# Patient Record
Sex: FEMALE | Race: OTHER | HISPANIC OR LATINO | Employment: UNEMPLOYED | URBAN - METROPOLITAN AREA
[De-identification: names, ages, dates, MRNs, and addresses within clinical notes are randomized per-mention and may not be internally consistent; named-entity substitution may affect disease eponyms.]

---

## 2017-03-28 ENCOUNTER — HOSPITAL ENCOUNTER (EMERGENCY)
Facility: HOSPITAL | Age: 37
Discharge: HOME | End: 2017-03-28
Admitting: EMERGENCY MEDICINE
Payer: COMMERCIAL

## 2017-03-28 VITALS
SYSTOLIC BLOOD PRESSURE: 127 MMHG | DIASTOLIC BLOOD PRESSURE: 75 MMHG | OXYGEN SATURATION: 98 % | TEMPERATURE: 97.5 F | HEART RATE: 70 BPM | WEIGHT: 170 LBS | RESPIRATION RATE: 16 BRPM

## 2017-03-28 DIAGNOSIS — H57.89 IRRITATION OF BOTH EYES: Primary | ICD-10-CM

## 2017-03-28 PROCEDURE — 99283 EMERGENCY DEPT VISIT LOW MDM: CPT

## 2017-03-28 RX ORDER — POLYMYXIN B SULFATE AND TRIMETHOPRIM 1; 10000 MG/ML; [USP'U]/ML
1 SOLUTION OPHTHALMIC EVERY 4 HOURS
Qty: 10 ML | Refills: 0 | Status: SHIPPED | OUTPATIENT
Start: 2017-03-28 | End: 2017-04-04

## 2017-03-28 RX ORDER — PROPARACAINE HYDROCHLORIDE 5 MG/ML
2 SOLUTION/ DROPS OPHTHALMIC ONCE
Status: COMPLETED | OUTPATIENT
Start: 2017-03-28 | End: 2017-03-28

## 2017-03-28 RX ORDER — GABAPENTIN 300 MG/1
300 CAPSULE ORAL 3 TIMES DAILY
COMMUNITY

## 2017-03-28 RX ADMIN — PROPARACAINE HYDROCHLORIDE 2 DROP: 5 SOLUTION/ DROPS OPHTHALMIC at 13:00

## 2017-03-28 RX ADMIN — FLUORESCEIN SODIUM 1 STRIP: 1 STRIP OPHTHALMIC at 13:00

## 2017-12-20 ENCOUNTER — APPOINTMENT (EMERGENCY)
Dept: RADIOLOGY | Facility: HOSPITAL | Age: 37
End: 2017-12-20
Payer: COMMERCIAL

## 2017-12-20 ENCOUNTER — HOSPITAL ENCOUNTER (EMERGENCY)
Facility: HOSPITAL | Age: 37
Discharge: HOME | End: 2017-12-20
Admitting: EMERGENCY MEDICINE
Payer: COMMERCIAL

## 2017-12-20 VITALS
SYSTOLIC BLOOD PRESSURE: 144 MMHG | OXYGEN SATURATION: 98 % | DIASTOLIC BLOOD PRESSURE: 77 MMHG | HEART RATE: 79 BPM | TEMPERATURE: 98.3 F | RESPIRATION RATE: 18 BRPM

## 2017-12-20 DIAGNOSIS — H10.9 CONJUNCTIVITIS: ICD-10-CM

## 2017-12-20 DIAGNOSIS — J45.901 ASTHMA EXACERBATION: ICD-10-CM

## 2017-12-20 DIAGNOSIS — J18.9 PNEUMONIA: Primary | ICD-10-CM

## 2017-12-20 PROCEDURE — 71020 HB CHEST X-RAY 2VW FRONTAL&LATL: CPT

## 2017-12-20 PROCEDURE — 87801 DETECT AGNT MULT DNA AMPLI: CPT | Performed by: PHYSICIAN ASSISTANT

## 2017-12-20 PROCEDURE — 99283 EMERGENCY DEPT VISIT LOW MDM: CPT

## 2017-12-20 RX ORDER — IPRATROPIUM BROMIDE AND ALBUTEROL SULFATE 2.5; .5 MG/3ML; MG/3ML
3 SOLUTION RESPIRATORY (INHALATION) ONCE
Status: COMPLETED | OUTPATIENT
Start: 2017-12-20 | End: 2017-12-20

## 2017-12-20 RX ORDER — IPRATROPIUM BROMIDE AND ALBUTEROL SULFATE 2.5; .5 MG/3ML; MG/3ML
3 SOLUTION RESPIRATORY (INHALATION)
Status: DISCONTINUED | OUTPATIENT
Start: 2017-12-20 | End: 2017-12-20

## 2017-12-20 RX ORDER — PREDNISONE 50 MG/1
50 TABLET ORAL DAILY
Qty: 4 TABLET | Refills: 0 | Status: SHIPPED | OUTPATIENT
Start: 2017-12-21 | End: 2017-12-25

## 2017-12-20 RX ORDER — BENZONATATE 200 MG/1
200 CAPSULE ORAL 3 TIMES DAILY PRN
Qty: 10 CAPSULE | Refills: 0 | Status: SHIPPED | OUTPATIENT
Start: 2017-12-20 | End: 2017-12-23

## 2017-12-20 RX ORDER — ERYTHROMYCIN 5 MG/G
0.5 OINTMENT OPHTHALMIC EVERY 6 HOURS SCHEDULED
Qty: 3.5 G | Refills: 0 | Status: SHIPPED | OUTPATIENT
Start: 2017-12-20 | End: 2017-12-25

## 2017-12-20 RX ORDER — AZITHROMYCIN 250 MG/1
TABLET, FILM COATED ORAL
Qty: 6 TABLET | Refills: 0 | Status: SHIPPED | OUTPATIENT
Start: 2017-12-20 | End: 2019-10-17 | Stop reason: ALTCHOICE

## 2017-12-20 RX ADMIN — IPRATROPIUM BROMIDE AND ALBUTEROL SULFATE 3 ML: .5; 3 SOLUTION RESPIRATORY (INHALATION) at 18:41

## 2017-12-20 RX ADMIN — PREDNISONE 50 MG: 20 TABLET ORAL at 18:35

## 2017-12-20 NOTE — ED PROVIDER NOTES
History  Chief Complaint   Patient presents with    Cough     pt states coughing has been going on since November 27  pt been treating with OTC with no relief  denies fevers, occasional chills, no n/v/d  hx asthma has been using  albuterol and doubling dose  Patient is a 55-year-old female with a past medical history of asthma who presents for evaluation dry cough, nasal congestion, sneezing and bilateral eye discharge/crusting in the morning that started on November 27th  She states she has worn contacts in the past but she has not been wearing them recently  She denies any injury or trauma  She denies any eye redness, pain, swelling, foreign body sensation, tearing, vision changes  She states she has not been evaluated  She has been trying over-the-counter medications and her nebulizer machine at home with some improvement of her symptoms  She states last few days her symptoms have been worse and she has been experiencing more chest tightness and wheezing  She states the last time she used her nebulizer treatment was at 1:30 p m  Lynita Ped She states she is a smoker but she has not been smoking recently due to her symptoms  She denies fever, chills, chest pain, shortness breath, sore throat, ear pain, recent travel, known sick contacts, history of hospitalization or intubation for her asthma  Cough   Associated symptoms: eye discharge and wheezing    Associated symptoms: no chest pain, no chills, no ear pain, no fever, no rash, no shortness of breath and no sore throat        Prior to Admission Medications   Prescriptions Last Dose Informant Patient Reported? Taking? albuterol (PROVENTIL HFA,VENTOLIN HFA) 90 mcg/act inhaler   Yes No   Sig: Inhale 2 puffs every 6 (six) hours as needed for wheezing     gabapentin (NEURONTIN) 300 mg capsule   Yes No   Sig: Take 300 mg by mouth 3 (three) times a day   traMADol (ULTRAM) 50 mg tablet   Yes No   Sig: Take 50 mg by mouth every 6 (six) hours as needed for moderate pain  Facility-Administered Medications: None       Past Medical History:   Diagnosis Date    Asthma     Migraine        Past Surgical History:   Procedure Laterality Date    HYSTERECTOMY         History reviewed  No pertinent family history  I have reviewed and agree with the history as documented  Social History   Substance Use Topics    Smoking status: Current Every Day Smoker     Packs/day: 0 50     Types: Cigarettes    Smokeless tobacco: Never Used      Comment: has not smoked since november    Alcohol use No        Review of Systems   Constitutional: Negative for chills and fever  HENT: Positive for congestion, sinus pain and sinus pressure  Negative for ear pain and sore throat  Eyes: Positive for discharge  Negative for photophobia, pain, redness, itching and visual disturbance  Respiratory: Positive for cough, chest tightness and wheezing  Negative for shortness of breath  Cardiovascular: Negative for chest pain and leg swelling  Gastrointestinal: Negative for abdominal pain, nausea and vomiting  Genitourinary: Negative for dysuria  Musculoskeletal: Negative for neck pain and neck stiffness  Skin: Negative for rash  All other systems reviewed and are negative  Physical Exam  ED Triage Vitals [12/20/17 1637]   Temperature Pulse Respirations Blood Pressure SpO2   98 3 °F (36 8 °C) 83 18 120/74 98 %      Temp Source Heart Rate Source Patient Position - Orthostatic VS BP Location FiO2 (%)   Oral Monitor Sitting Right arm --      Pain Score       No Pain           Orthostatic Vital Signs  Vitals:    12/20/17 1637 12/20/17 1925   BP: 120/74 144/77   Pulse: 83 79   Patient Position - Orthostatic VS: Sitting Sitting       Physical Exam   Constitutional: Vital signs are normal  She appears well-developed and well-nourished  She is active  Non-toxic appearance  No distress  HENT:   Head: Normocephalic and atraumatic     Right Ear: Hearing, tympanic membrane, external ear and ear canal normal    Left Ear: Hearing, tympanic membrane, external ear and ear canal normal    Nose: Mucosal edema present  Mouth/Throat: Oropharynx is clear and moist  No oropharyngeal exudate  Mild nasal congestion present  Eyes: Conjunctivae, EOM and lids are normal  Pupils are equal, round, and reactive to light  Right eye exhibits no discharge and no exudate  No foreign body present in the right eye  Left eye exhibits no discharge and no exudate  No foreign body present in the left eye  Neck: Normal range of motion  Neck supple  Cardiovascular: Normal rate, regular rhythm and normal heart sounds  Exam reveals no gallop and no friction rub  No murmur heard  Pulmonary/Chest: Effort normal  No respiratory distress  She has wheezes  She has no rales  Mild intermittent exp  Wheezes  Constant dry cough  No accessory muscle usage, stridor, tachypnea  Abdominal: Soft  Bowel sounds are normal  She exhibits no distension  There is no tenderness  There is no guarding  Musculoskeletal: Normal range of motion  Neurological: She is alert  Skin: Skin is warm and dry  She is not diaphoretic  Psychiatric: She has a normal mood and affect  Her behavior is normal  Judgment and thought content normal    Nursing note and vitals reviewed  ED Medications  Medications   predniSONE tablet 50 mg (50 mg Oral Given 12/20/17 1835)   ipratropium-albuterol (DUO-NEB) 0 5-2 5 mg/mL inhalation solution 3 mL (3 mL Nebulization Given 12/20/17 1841)       Diagnostic Studies  Results Reviewed     Procedure Component Value Units Date/Time    Bordetella pertussis / parapertussis PCR [[id number]] Collected:  12/20/17 1829    Lab Status:   In process Specimen:  Nasopharyngeal from Nose Updated:  12/20/17 1856                 XR chest 2 views    (Results Pending)              Procedures  Procedures       Phone Contacts  ED Phone Contact    ED Course  ED Course                                MDM  Number of Diagnoses or Management Options  Asthma exacerbation:   Conjunctivitis:   Pneumonia:   Diagnosis management comments: Patient is a 64-year-old female presents for evaluation of dry cough for almost 1 month  There has been associated nasal congestion, sneezing and eye discharge/crusting in the morning  She has a history of asthma knows there has been occasional wheezing and tightness  She states last few days her symptoms have been worse  She last used her neck nebulizer machine at 1:30 p m  today  She is well-appearing, nontoxic in no acute distress  She has some nasal congestion and discharge and some intermittent expiratory wheezes  She has a constant dry cough  There is no respiratory distress, accessory muscle usage or tachypnea  Will give a DuoNeb treatment here and a dose of prednisone  Patient notes improvement of her symptoms after DuoNeb treatment  There is increased aeration and no current wheezes  Her cough has improved  Will send a pertussis PCR-explained results will not be back today but she will be contacted with any positive results  Will check a chest x-ray  Chest x-ray reviewed by me and interpreted as right lower lobe pneumonia  Discussed results with patient  Will treat asthma exacerbation and pneumonia with azithromycin, prednisone and Tessalon Perles  Discussed supportive treatment with Tylenol/Motrin as directed, tea with honey, throat lozenges, etc   Will give erythromycin ophthalmic ointment for her conjunctivitis  Continue to not use her contact lenses  Instructed to follow up family doctor and eye doctor in 1 day  Return to the ED if symptoms worsen or new symptoms arise  Patient states understanding and agrees with plan         Amount and/or Complexity of Data Reviewed  Clinical lab tests: ordered  Tests in the radiology section of CPT®: ordered and reviewed  Independent visualization of images, tracings, or specimens: yes    Risk of Complications, Morbidity, and/or Mortality  Presenting problems: low  Diagnostic procedures: low  Management options: low    Patient Progress  Patient progress: stable    CritCare Time    Disposition  Final diagnoses:   Pneumonia   Asthma exacerbation   Conjunctivitis     Time reflects when diagnosis was documented in both MDM as applicable and the Disposition within this note     Time User Action Codes Description Comment    12/20/2017  8:00 PM Berdine Bitter Add [J18 9] Pneumonia     12/20/2017  8:00 PM Berdine Bitter Add [M61 743] Asthma exacerbation     12/20/2017  8:04 PM Berdine Bitter Add [H10 9] Conjunctivitis       ED Disposition     ED Disposition Condition Comment    Discharge  Nathaly Crook discharge to home/self care  Condition at discharge: Good        Follow-up Information     Follow up With Specialties Details Why Contact Info Additional Jori Millan MD  Schedule an appointment as soon as possible for a visit in 1 day  [address] [facility]    [address] [facility] Emergency Department Emergency Medicine  If symptoms worsen or new symptoms arise as discussed  [address] [facility]  [phone] [place] [facility], [address], [place], [address]        Discharge Medication List as of 12/20/2017  8:05 PM      START taking these medications    Details   azithromycin (ZITHROMAX) 250 mg tablet Take 2(two) tablets on day 1(one) and take 1 tablet on day 2-5, Print      benzonatate (TESSALON) 200 MG capsule Take 1 capsule by mouth 3 (three) times a day as needed for cough for up to 3 days, Starting Wed 12/20/2017, Until Sat 12/23/2017, Print      erythromycin [facility]) ophthalmic ointment Administer 0 5 inches to both eyes every 6 (six) hours for 5 days, Starting Wed 12/20/2017, Until Mon 12/25/2017, Print      predniSONE 50 mg tablet Take 1 tablet by mouth daily for 4 days, Starting Thu 12/21/2017, Until Mon 12/25/2017, Print CONTINUE these medications which have NOT CHANGED    Details   albuterol (PROVENTIL HFA,VENTOLIN HFA) 90 mcg/act inhaler Inhale 2 puffs every 6 (six) hours as needed for wheezing , Until Discontinued, Historical Med      gabapentin (NEURONTIN) 300 mg capsule Take 300 mg by mouth 3 (three) times a day, Until Discontinued, Historical Med      traMADol (ULTRAM) 50 mg tablet Take 50 mg by mouth every 6 (six) hours as needed for moderate pain , Until Discontinued, Historical Med           No discharge procedures on file      ED Provider  Electronically Signed by           Rossy Felipe PA-C  12/20/17 7355

## 2017-12-21 LAB
B PARAPERT DNA SPEC QL NAA+PROBE: NOT DETECTED
B PERT DNA SPEC QL NAA+PROBE: NOT DETECTED

## 2017-12-21 NOTE — DISCHARGE INSTRUCTIONS
Asthma   WHAT YOU NEED TO KNOW:   Asthma is a lung disease that makes breathing difficult  Chronic inflammation and reactions to triggers narrow the airways in the lungs  Asthma can become life-threatening if it is not managed  DISCHARGE INSTRUCTIONS:   Return to the emergency department if:   · You have severe shortness of breath  · Your lips or nails turn blue or gray  · The skin around your neck and ribs pulls in with each breath  · You have shortness of breath, even after you take your short-term medicine as directed  · Your peak flow numbers are in the red zone of your AAP  Contact your healthcare provider if:   · You run out of medicine before your next refill is due  · Your symptoms get worse  · You need to take more medicine than usual to control your symptoms  · You have questions or concerns about your condition or care  Medicines:   · Medicines  decrease inflammation, open airways, and make it easier to breathe  Medicines may be inhaled, taken as a pill, or injected  Short-term medicines relieve your symptoms quickly  Long-term medicines are used to prevent future attacks  You may also need medicine to help control your allergies  Ask your healthcare provider for more information about the medicine you are given and how to take it safely  · Take your medicine as directed  Contact your healthcare provider if you think your medicine is not helping or if you have side effects  Tell him of her if you are allergic to any medicine  Keep a list of the medicines, vitamins, and herbs you take  Include the amounts, and when and why you take them  Bring the list or the pill bottles to follow-up visits  Carry your medicine list with you in case of an emergency  Follow up with your healthcare provider as directed: You will need to return to make sure your medicine is working and your symptoms are controlled   You may be referred to an asthma specialist  Fabrizio Lewis may be asked to keep a record of your peak flow values and bring it with you to your appointments  Write down your questions so you remember to ask them during your visits  Manage your symptoms and prevent future attacks:   · Follow your Asthma Action Plan (AAP)  This is a written plan that you and your healthcare provider create  It explains which medicine you need and when to change doses if necessary  It also explains how you can monitor symptoms and use a peak flow meter  The meter measures how well your lungs are working  · Manage other health conditions , such as allergies, acid reflux, and sleep apnea  · Identify and avoid triggers  These may include pets, dust mites, mold, and cockroaches  · Do not smoke or be around others who smoke  Nicotine and other chemicals in cigarettes and cigars can cause lung damage  Ask your healthcare provider for information if you currently smoke and need help to quit  E-cigarettes or smokeless tobacco still contain nicotine  Talk to your healthcare provider before you use these products  · Ask about the flu vaccine  The flu can make your asthma worse  You may need a yearly flu shot  © 2017 [address] [facility] Information is for End User's use only and may not be sold, redistributed or otherwise used for commercial purposes  All illustrations and images included in CareNotes® are the copyrighted property of [employer] A M [employer]  or [employer]  The above information is an  only  It is not intended as medical advice for individual conditions or treatments  Talk to your doctor, nurse or pharmacist before following any medical regimen to see if it is safe and effective for you  Pneumonia   WHAT YOU NEED TO KNOW:   Pneumonia is an infection in your lungs caused by bacteria, viruses, fungi, or parasites  You can become infected if you come in contact with someone who is sick   You can get pneumonia if you recently had surgery or needed a ventilator to help you breathe  Pneumonia can also be caused by accidentally inhaling saliva or small pieces of food  Pneumonia may cause mild symptoms, or it can be severe and life-threatening  DISCHARGE INSTRUCTIONS:   Return to the emergency department if:   · You cough up blood  · Your heart beats more than 100 beats in 1 minute  · You are very tired, confused, and cannot think clearly  · You have chest pain or trouble breathing  · Your lips or fingernails turn gray or blue  Contact your healthcare provider if:   · Your symptoms are the same or get worse 48 hours after you start antibiotics  · Your fever is not below 99°F (37 2°C) 48 hours after you start antibiotics  · You have a fever higher than 101°F (38 3°C)  · You cannot eat, or you have loss of appetite, nausea, or are vomiting  · You have questions or concerns about your condition or care  Medicines:   · Antibiotics  treat pneumonia caused by bacteria  · Acetaminophen  decreases pain and fever  It is available without a doctor's order  Ask how much to take and how often to take it  Follow directions  Read the labels of all other medicines you are using to see if they also contain acetaminophen, or ask your doctor or pharmacist  Acetaminophen can cause liver damage if not taken correctly  Do not use more than 4 grams (4,000 milligrams) total of acetaminophen in one day  · NSAIDs , such as ibuprofen, help decrease swelling, pain, and fever  This medicine is available with or without a doctor's order  NSAIDs can cause stomach bleeding or kidney problems in certain people  If you take blood thinner medicine, always ask your healthcare provider if NSAIDs are safe for you  Always read the medicine label and follow directions  · Take your medicine as directed  Contact your healthcare provider if you think your medicine is not helping or if you have side effects   Tell him or her if you are allergic to any medicine  Keep a list of the medicines, vitamins, and herbs you take  Include the amounts, and when and why you take them  Bring the list or the pill bottles to follow-up visits  Carry your medicine list with you in case of an emergency  Follow up with your healthcare provider as directed: You will need to return for more tests  Write down your questions so you remember to ask them during your visits  Manage your symptoms:   · Rest as needed  Rest often throughout the day  Alternate times of activity with times of rest     · Drink liquids as directed  Ask how much liquid to drink each day and which liquids are best for you  Liquids help thin your mucus, which may make it easier for you to cough it up  · Do not smoke  Avoid secondhand smoke  Smoking increases your risk for pneumonia  Smoking also makes it harder for you to get better after you have had pneumonia  Ask your healthcare provider for information if you need help to quit smoking  · Use a cool mist humidifier  A humidifier will help increase air moisture in your home  This may make it easier for you to breathe and help decrease your cough  · Keep your head elevated  You may be able to breathe better if you lie down with the head of your bed up  Prevent pneumonia:   · Prevent the spread of germs  Wash your hands often with soap and water  Use gel hand cleanser when there is no soap and water available  Do not touch your eyes, nose, or mouth unless you have washed your hands first  Cover your mouth when you cough  Cough into a tissue or your shirtsleeve so you do not spread germs from your hands  If you are sick, stay away from others as much as possible  · Limit alcohol  Women should limit alcohol to 1 drink a day  Men should limit alcohol to 2 drinks a day  A drink of alcohol is 12 ounces of beer, 5 ounces of wine, or 1½ ounces of liquor  · Ask about vaccines  You may need a vaccine to help prevent pneumonia   Get an influenza (flu) vaccine every year as soon as it becomes available  © 2017 [address] [employer] Information is for End User's use only and may not be sold, redistributed or otherwise used for commercial purposes  All illustrations and images included in CareNotes® are the copyrighted property of A D A M , Inc  or [employer]  The above information is an  only  It is not intended as medical advice for individual conditions or treatments  Talk to your doctor, nurse or pharmacist before following any medical regimen to see if it is safe and effective for you  Conjunctivitis   WHAT YOU NEED TO KNOW:   Conjunctivitis, or pink eye, is inflammation of your conjunctiva  The conjunctiva is a thin tissue that covers the front of your eye and the back of your eyelids  The conjunctiva helps protect your eye and keep it moist  Conjunctivitis may be caused by bacteria, allergies, or a virus  If your conjunctivitis is caused by bacteria, it may get better on its own in about 7 days  Viral conjunctivitis can last up to 3 weeks  DISCHARGE INSTRUCTIONS:   Return to the emergency department if:   · You have worsening eye pain  · The swelling in your eye gets worse, even after treatment  · Your vision suddenly becomes worse or you cannot see at all  Contact your healthcare provider if:   · You develop a fever and ear pain  · You have tiny bumps or spots of blood on your eye  · You have questions or concerns about your condition or care  Manage your symptoms:   · Apply a cool compress  Wet a washcloth with cold water and place it on your eye  This will help decrease itching and irritation  · Do not wear contact lenses  They can irritate your eye  Throw away the pair you are using and ask when you can wear them again  Use a new pair of lenses when your healthcare provider says it is okay  · Avoid irritants  Stay away from smoke filled areas   Shield your eyes from wind and sun  · Flush your eye  You may need to flush your eye with saline to help decrease your symptoms  Ask for more information on how to flush your eye  Medicines:  Treatment depends on what is causing your conjunctivitis  You may be given any of the following:  · Allergy medicine  helps decrease itchy, red, swollen eyes caused by allergies  It may be given as a pill, eye drops, or nasal spray  · Antibiotics  may be needed if your conjunctivitis is caused by bacteria  This medicine may be given as a pill, eye drops, or eye ointment  · Take your medicine as directed  Contact your healthcare provider if you think your medicine is not helping or if you have side effects  Tell him or her if you are allergic to any medicine  Keep a list of the medicines, vitamins, and herbs you take  Include the amounts, and when and why you take them  Bring the list or the pill bottles to follow-up visits  Carry your medicine list with you in case of an emergency  Prevent the spread of conjunctivitis:   · Wash your hands with soap and water often  Wash your hands before and after you touch your eyes  Also wash your hands before you prepare or eat food and after you use the bathroom or change a diaper  · Avoid allergens  Try to avoid the things that cause your allergies, such as pets, dust, or grass  · Avoid contact with others  Do not share towels or washcloths  Try to stay away from others as much as possible  Ask when you can return to work or school  · Throw away eye makeup  The bacteria that caused your conjunctivitis can stay in eye makeup  Throw away mascara and other eye makeup  © 2017 [address] [employer]  Information is for End User's use only and may not be sold, redistributed or otherwise used for commercial purposes  All illustrations and images included in CareNotes® are the copyrighted property of [employer]  or [employer]    The above information is an  only  It is not intended as medical advice for individual conditions or treatments  Talk to your doctor, nurse or pharmacist before following any medical regimen to see if it is safe and effective for you

## 2018-12-14 ENCOUNTER — HOSPITAL ENCOUNTER (EMERGENCY)
Facility: HOSPITAL | Age: 38
Discharge: HOME | End: 2018-12-14

## 2018-12-14 ENCOUNTER — TELEPHONE (OUTPATIENT)
Dept: FAMILY MEDICINE CLINIC | Facility: CLINIC | Age: 38
End: 2018-12-14

## 2018-12-14 VITALS
TEMPERATURE: 98.5 F | SYSTOLIC BLOOD PRESSURE: 130 MMHG | RESPIRATION RATE: 20 BRPM | OXYGEN SATURATION: 97 % | HEART RATE: 93 BPM | WEIGHT: 166 LBS | DIASTOLIC BLOOD PRESSURE: 91 MMHG

## 2018-12-14 DIAGNOSIS — G43.909 MIGRAINE: Primary | ICD-10-CM

## 2018-12-14 PROCEDURE — 96374 THER/PROPH/DIAG INJ IV PUSH: CPT

## 2018-12-14 PROCEDURE — 96361 HYDRATE IV INFUSION ADD-ON: CPT

## 2018-12-14 PROCEDURE — 99283 EMERGENCY DEPT VISIT LOW MDM: CPT

## 2018-12-14 PROCEDURE — 96375 TX/PRO/DX INJ NEW DRUG ADDON: CPT

## 2018-12-14 RX ORDER — KETOROLAC TROMETHAMINE 30 MG/ML
15 INJECTION, SOLUTION INTRAMUSCULAR; INTRAVENOUS ONCE
Status: COMPLETED | OUTPATIENT
Start: 2018-12-14 | End: 2018-12-14

## 2018-12-14 RX ORDER — METOCLOPRAMIDE HYDROCHLORIDE 5 MG/ML
10 INJECTION INTRAMUSCULAR; INTRAVENOUS ONCE
Status: COMPLETED | OUTPATIENT
Start: 2018-12-14 | End: 2018-12-14

## 2018-12-14 RX ORDER — DIPHENHYDRAMINE HYDROCHLORIDE 50 MG/ML
25 INJECTION INTRAMUSCULAR; INTRAVENOUS ONCE
Status: COMPLETED | OUTPATIENT
Start: 2018-12-14 | End: 2018-12-14

## 2018-12-14 RX ADMIN — DIPHENHYDRAMINE HYDROCHLORIDE 25 MG: 50 INJECTION INTRAMUSCULAR; INTRAVENOUS at 07:01

## 2018-12-14 RX ADMIN — SODIUM CHLORIDE 1000 ML: 0.9 INJECTION, SOLUTION INTRAVENOUS at 07:00

## 2018-12-14 RX ADMIN — KETOROLAC TROMETHAMINE 15 MG: 30 INJECTION, SOLUTION INTRAMUSCULAR at 07:01

## 2018-12-14 RX ADMIN — METOCLOPRAMIDE 10 MG: 5 INJECTION, SOLUTION INTRAMUSCULAR; INTRAVENOUS at 07:00

## 2018-12-14 NOTE — ED PROVIDER NOTES
History  Chief Complaint   Patient presents with    Neck Pain     Pt reports chronic neck pain but worsening in the past two days  Pt denies injury to neck but reports cervical fx years ago  Patient is a 53-year-old female who presents today with a chief complaint of migraine  Patient reports that this migraine feels exactly like previous migraines that she is not experiencing any numbness tingling, weakness, paralysis  Patient reports she does have some associated nausea however has not vomited today  Patient reports light and sound make her headache worse along with movement  Patient reports no recent head trauma  Patient reports she has tried her sumatriptan and Topamax at home which she was prescribed for migraines however they have had no effect that she has had no relief from her headache  Patient reports the migraine started yesterday and the patient has had difficulty sleeping due to the migraine  Patient states she does have associated neck pain however she reports that this neck pain is normal to occur with her migraine  Patient reports she believes the migraine began due to being tense yesterday however she does report lack of sleep as well  Patient reports she has not tried any other medications to relieve her headache and states she normally comes here and gets medication and feels better   used: No        Prior to Admission Medications   Prescriptions Last Dose Informant Patient Reported? Taking? albuterol (PROVENTIL HFA,VENTOLIN HFA) 90 mcg/act inhaler   Yes No   Sig: Inhale 2 puffs every 6 (six) hours as needed for wheezing  azithromycin (ZITHROMAX) 250 mg tablet   No No   Sig: Take 2(two) tablets on day 1(one) and take 1 tablet on day 2-5   gabapentin (NEURONTIN) 300 mg capsule   Yes No   Sig: Take 300 mg by mouth 3 (three) times a day   traMADol (ULTRAM) 50 mg tablet   Yes No   Sig: Take 50 mg by mouth every 6 (six) hours as needed for moderate pain  Facility-Administered Medications: None       Past Medical History:   Diagnosis Date    Asthma     Cervical radiculopathy 10/14/2015    A LITTLE BETTER    Migraine        Past Surgical History:   Procedure Laterality Date    HYSTERECTOMY      PARTIAL HYSTERECTOMY  2003       Family History   Problem Relation Age of Onset    Heart disease Family     Hypertension Family     Hyperlipidemia Family      I have reviewed and agree with the history as documented  Social History   Substance Use Topics    Smoking status: Current Every Day Smoker     Packs/day: 0 50     Types: Cigarettes    Smokeless tobacco: Never Used      Comment: has not smoked since november    Alcohol use Yes      Comment: social        Review of Systems   Constitutional: Negative for chills, fatigue and fever  HENT: Negative for congestion, ear pain, rhinorrhea and sore throat  Eyes: Negative for redness  Respiratory: Negative for chest tightness and shortness of breath  Cardiovascular: Negative for chest pain and palpitations  Gastrointestinal: Positive for nausea  Negative for abdominal pain and vomiting  Genitourinary: Negative for dysuria and hematuria  Musculoskeletal: Negative  Skin: Negative for rash  Neurological: Positive for headaches  Negative for dizziness, syncope, light-headedness and numbness  Physical Exam  Physical Exam   Constitutional: She is oriented to person, place, and time  She appears well-developed and well-nourished  78-year-old female curled laying on her left side rocking appears in pain,  crying   HENT:   Head: Normocephalic  Eyes: Pupils are equal, round, and reactive to light  No scleral icterus  Cardiovascular: Normal rate and regular rhythm  Pulmonary/Chest: Effort normal and breath sounds normal  No stridor  Abdominal: Soft  She exhibits no distension  There is no tenderness  Musculoskeletal: Normal range of motion     Neurological: She is alert and oriented to person, place, and time  She is not disoriented  No cranial nerve deficit  GCS eye subscore is 4  GCS verbal subscore is 5  GCS motor subscore is 6  Skin: Skin is warm and dry  Capillary refill takes less than 2 seconds  Psychiatric: She has a normal mood and affect  Nursing note and vitals reviewed  Vital Signs  ED Triage Vitals [12/14/18 0614]   Temperature Pulse Respirations Blood Pressure SpO2   98 5 °F (36 9 °C) 93 20 130/91 97 %      Temp Source Heart Rate Source Patient Position - Orthostatic VS BP Location FiO2 (%)   Oral Monitor Sitting Right arm --      Pain Score       Worst Possible Pain           Vitals:    12/14/18 0614   BP: 130/91   Pulse: 93   Patient Position - Orthostatic VS: Sitting       Visual Acuity      ED Medications  Medications   sodium chloride 0 9 % bolus 1,000 mL (1,000 mL Intravenous New Bag 12/14/18 0700)   diphenhydrAMINE (BENADRYL) injection 25 mg (25 mg Intravenous Given 12/14/18 0701)   ketorolac (TORADOL) injection 15 mg (15 mg Intravenous Given 12/14/18 0701)   metoclopramide (REGLAN) injection 10 mg (10 mg Intravenous Given 12/14/18 0700)       Diagnostic Studies  Results Reviewed     None                 No orders to display              Procedures  Procedures       Phone Contacts  ED Phone Contact    ED Course             Patient feels much better after IV fluids, and migraine medications given in the ED, patient agreeable to discharge will follow up with PCP                  [facility]  CritCare Time    Disposition  Final diagnoses:   Migraine     Time reflects when diagnosis was documented in both MDM as applicable and the Disposition within this note     Time User Action Codes Description Comment    12/14/2018  8:02 AM Nikki Zacarias [[id number]] Migraine       ED Disposition     ED Disposition Condition Comment    Discharge  Lesliefabi Joseph discharge to home/self care      Condition at discharge: Good        Follow-up Information     Follow up With Specialties Details Why Cheryl Linares MD Family Medicine Schedule an appointment as soon as possible for a visit in 1 day  [address]  [phone]            Patient's Medications   Discharge Prescriptions    No medications on file     No discharge procedures on file      ED Provider  Electronically Signed by           Salome Weldon PA-C  12/14/18 2229

## 2018-12-14 NOTE — DISCHARGE INSTRUCTIONS
Migraine Headache   WHAT YOU SHOULD KNOW:   A migraine is a severe headache  The pain can be so severe that it interferes with your daily activities  A migraine can last a few hours up to several days  The exact cause of migraines is not known  It may be caused by changes in your body chemicals and extra sensitive nerves in your brain  AFTER YOU LEAVE:   Medicines:  Take medicine as soon as you feel a migraine begin  · Pain medicine: You may need medicine to take away or decrease pain  You may need a doctor's order for this medicine  Do not wait until the pain is severe before you take your medicine  · Migraine medicines: These are used to help prevent a migraine or stop it once it starts  · Antinausea medicine: This medicine may be given to calm your stomach and to help prevent vomiting  They can also help relieve pain  · Take your medicine as directed  Call your healthcare provider if you think your medicine is not helping or if you have side effects  Tell him if you are allergic to any medicine  Keep a list of the medicines, vitamins, and herbs you take  Include the amounts, and when and why you take them  Bring the list or the pill bottles to follow-up visits  Carry your medicine list with you in case of an emergency  Manage your symptoms:   · Rest:  Rest in a dark, quiet room  This will help decrease your pain  · Ice:  Ice helps decrease pain  Use an ice pack or put crushed ice in a plastic bag  Cover the ice pack with a towel and place it on your head where it hurts for 15 to 20 minutes every hour  · Heat:  Heat helps decrease pain and muscle spasms  Use a small towel dampened with warm water or a heating pad, or sit in a warm bath  Apply heat on the area for 20 to 30 minutes every 2 hours  You may alternate heat and ice  Keep a headache diary:  Write down when your migraines start and stop  Include your symptoms and what you were doing when a migraine began   Record what you ate or drank for 24 hours before the migraine started  Describe the pain and where it hurts  Keep track of what you did to treat your migraine and whether it worked  Follow up with your primary healthcare provider or neurologist as directed:  Bring your headache diary with you when you see your primary healthcare provider  Write down your questions so you remember to ask them during your visits  Prevent another migraine:   · Do not smoke: If you smoke, it is never too late to quit  Tobacco smoke can trigger a migraine  It can also cause heart disease, lung disease, cancer, and other health problems  Quitting smoking will improve your health and the health of those around you  If you smoke, ask for information about how to stop  · Do not drink alcohol:  Alcohol can trigger a migraine  It can also interfere with the medicines used to treat your migraine  · Get regular exercise:  Exercise may help prevent migraines  Talk to your primary healthcare provider about the best exercise plan for you  · Manage stress:  Stress may trigger a migraine  Learn new ways to relax, such as deep breathing  · Stick to a sleep schedule:  Go to bed and get up at the same time each day  · Eat regular meals:  Include healthy foods such as include fruit, vegetables, whole-grain breads, low-fat dairy products, beans, lean meat, and fish  Avoid trigger foods like chocolate, hard cheese, and red wine  Foods that contain gluten, nitrates, MSG, or artificial sweeteners may also trigger migraines  Caffeine, which is often used to treat migraines, can also trigger them  Contact your primary healthcare provider or neurologist if:   · You have a fever  · Your migraines interfere with your daily activities  · Your medicines or treatments stop working  · You have questions about your condition or care    Seek care immediately or call 911 if:   · You have a headache that seems different or much worse than your usual migraine headache  · You have a severe headache with a fever or a stiff neck  · You have new problems with speech, vision, balance, or movement  · You feel like you are going to faint, you become confused, or you have a seizure  © 2014 [address] is for End User's use only and may not be sold, redistributed or otherwise used for commercial purposes  All illustrations and images included in CareNotes® are the copyrighted property of [employer]  or [employer]  The above information is an  only  It is not intended as medical advice for individual conditions or treatments  Talk to your doctor, nurse or pharmacist before following any medical regimen to see if it is safe and effective for you

## 2024-04-15 DIAGNOSIS — Z00.6 ENCOUNTER FOR EXAMINATION FOR NORMAL COMPARISON OR CONTROL IN CLINICAL RESEARCH PROGRAM: ICD-10-CM

## 2024-04-16 ENCOUNTER — APPOINTMENT (OUTPATIENT)
Dept: LAB | Facility: MEDICAL CENTER | Age: 44
End: 2024-04-16

## 2024-04-16 DIAGNOSIS — Z00.6 ENCOUNTER FOR EXAMINATION FOR NORMAL COMPARISON OR CONTROL IN CLINICAL RESEARCH PROGRAM: ICD-10-CM

## 2024-04-16 PROCEDURE — 36415 COLL VENOUS BLD VENIPUNCTURE: CPT

## 2024-05-12 LAB
APOB+LDLR+PCSK9 GENE MUT ANL BLD/T: NOT DETECTED
BRCA1+BRCA2 DEL+DUP + FULL MUT ANL BLD/T: NOT DETECTED
MLH1+MSH2+MSH6+PMS2 GN DEL+DUP+FUL M: NOT DETECTED